# Patient Record
Sex: MALE | Race: WHITE | NOT HISPANIC OR LATINO | Employment: FULL TIME | URBAN - NONMETROPOLITAN AREA
[De-identification: names, ages, dates, MRNs, and addresses within clinical notes are randomized per-mention and may not be internally consistent; named-entity substitution may affect disease eponyms.]

---

## 2022-06-03 ENCOUNTER — OFFICE VISIT (OUTPATIENT)
Dept: SURGERY | Facility: CLINIC | Age: 32
End: 2022-06-03

## 2022-06-03 VITALS
SYSTOLIC BLOOD PRESSURE: 122 MMHG | WEIGHT: 195.8 LBS | HEART RATE: 79 BPM | HEIGHT: 70 IN | DIASTOLIC BLOOD PRESSURE: 64 MMHG | BODY MASS INDEX: 28.03 KG/M2

## 2022-06-03 DIAGNOSIS — R22.9 SUBCUTANEOUS MASS: Primary | ICD-10-CM

## 2022-06-03 PROCEDURE — 76857 US EXAM PELVIC LIMITED: CPT | Performed by: SURGERY

## 2022-06-03 PROCEDURE — 99203 OFFICE O/P NEW LOW 30 MIN: CPT | Performed by: SURGERY

## 2022-06-03 RX ORDER — LEVOFLOXACIN 500 MG/1
750 TABLET, FILM COATED ORAL DAILY
Qty: 10 TABLET | Refills: 0 | Status: SHIPPED | OUTPATIENT
Start: 2022-06-03 | End: 2022-06-13

## 2022-06-03 NOTE — PROGRESS NOTES
"Subjective   Gordon Matthews is a 31 y.o. male here today for mass on right buttock.     History of Present Illness  Mr. Matthews was seen in the office today for evaluation of a right subcutaneous perianal mass.  He states that it has been present for 3 to 4 weeks.  Initially it was very painful.  He took Zithromax for 5 days which she states was given to him for sinus infection.  He states that it did get smaller and less tender after the 5 days of antibiotics.  Never drained.  Patient denies any rectal pain or pain with bowel movements.  No fever or chills  Allergies   Allergen Reactions   • Augmentin [Amoxicillin-Pot Clavulanate] Hives   • Ceclor [Cefaclor] Hives     Current Outpatient Medications   Medication Sig Dispense Refill   • levoFLOXacin (Levaquin) 500 MG tablet Take 1.5 tablets by mouth Daily for 10 days. 10 tablet 0     No current facility-administered medications for this visit.     History reviewed. No pertinent past medical history.  Past Surgical History:   Procedure Laterality Date   • MOUTH SURGERY         Pertinent Review of Systems:  Respiratory: no shortness of breath  Cardiovascular: no chest pain  Other pertinent:      Objective   /64 (BP Location: Left arm)   Pulse 79   Ht 177.8 cm (70\")   Wt 88.8 kg (195 lb 12.8 oz)   BMI 28.09 kg/m²   Physical Exam  Perianal area: On the right inferior there is a 3-1/2 cm subcutaneous mass.  It is not clearly an abscess and there is no fluctuance but it does not have the characteristics of a lipoma.  There is no overlying cellulitis.  Procedures   Ultrasound soft tissue of perianal area    Indication: Subcutaneous mass  Description: With use of the 12.5 MHz transducer the area of clinical concern was scanned in multiple planes.  Findings: In the area of clinical concern there is a 2.63 Steidle mass which has a central hypoechoic area.  There is no posterior shadowing suggestive of an abscess.  Impression: Soft tissue mass right perianal area which " may represent a resolving abscess  Plan: Follow-up as clinically indicated  Results/Data:      Assessment & Plan   Perianal mass which may represent a resolving abscess although presentation is certainly atypical.  Size of mass is significantly larger than imaging finding    Plan: We will give the patient an additional 10 days of oral antibiotics with return to the office following.  If there is no improvement in the mass at that point in time would proceed with imaging to further evaluate         Discussion/Summary    Time spent:     BMI is >= 25 and < 30. (Overweight) The following options were offered after discussion: exercise counseling/recommendations       Future Appointments   Date Time Provider Department Center   6/20/2022  3:30 PM Dea Adams MD [facility]         Please note that portions of this note were completed with a voice recognition program.

## 2022-06-20 ENCOUNTER — OFFICE VISIT (OUTPATIENT)
Dept: SURGERY | Facility: CLINIC | Age: 32
End: 2022-06-20

## 2022-06-20 VITALS
HEART RATE: 76 BPM | HEIGHT: 70 IN | DIASTOLIC BLOOD PRESSURE: 78 MMHG | BODY MASS INDEX: 28.49 KG/M2 | SYSTOLIC BLOOD PRESSURE: 122 MMHG | WEIGHT: 199 LBS

## 2022-06-20 DIAGNOSIS — L02.31 CUTANEOUS ABSCESS OF BUTTOCK: ICD-10-CM

## 2022-06-20 DIAGNOSIS — L02.31 CUTANEOUS ABSCESS OF BUTTOCK: Primary | ICD-10-CM

## 2022-06-20 PROCEDURE — 87205 SMEAR GRAM STAIN: CPT | Performed by: SURGERY

## 2022-06-20 PROCEDURE — 46050 I&D PERIANAL ABSCESS SUPFC: CPT | Performed by: SURGERY

## 2022-06-20 PROCEDURE — 99212 OFFICE O/P EST SF 10 MIN: CPT | Performed by: SURGERY

## 2022-06-20 PROCEDURE — 87070 CULTURE OTHR SPECIMN AEROBIC: CPT | Performed by: SURGERY

## 2022-06-20 RX ORDER — LEVOFLOXACIN 750 MG/1
TABLET ORAL
COMMUNITY
Start: 2022-06-03

## 2022-06-20 NOTE — PROGRESS NOTES
"Subjective   Gordon Matthews is a 31 y.o. male here today for follow up. He only took four days of antibiotic.    History of Present Illness  Mr. Matthews was seen in the office today for follow-up of a right perineal mass.  He was initially seen on 6/3/2022 and the findings were felt to possibly reflect a resolving abscess.  He was given an additional prescription of Levaquin which he had a severe reaction to and discontinued after 3 doses.  He states that just recently he had some drainage in that area but he does not any longer feel a large mass.  Allergies   Allergen Reactions   • Augmentin [Amoxicillin-Pot Clavulanate] Hives   • Ceclor [Cefaclor] Hives       Current Outpatient Medications   Medication Sig Dispense Refill   • levoFLOXacin (LEVAQUIN) 750 MG tablet TAKE 1 TABLET BY MOUTH ONCE DAILY UNTIL GONE       No current facility-administered medications for this visit.     History reviewed. No pertinent past medical history.  Past Surgical History:   Procedure Laterality Date   • MOUTH SURGERY         Pertinent Review of Systems      Objective   /78 (BP Location: Left arm)   Pulse 76   Ht 177.8 cm (70\")   Wt 90.3 kg (199 lb)   BMI 28.55 kg/m²    Physical Exam  Well-developed well-nourished male in no acute distress  Perianal area the previously noted 3-1/2 cm mass has resolved.  There is a very small residual mass which is barely noticeable except for a drop of fluid draining from it.  There is no significant induration.  There is no significant fluctuance.  There is no cellulitis it is minimally tender.    Procedures   Procedure Note    Procedure: Incision and drainage abscess    Location: Right perianal area    Anesthesia: 1% lidocaine    Description:  The risks and benefits of the procedure were discussed.  After prep with Betadine and infiltration with lidocaine an 11 blade was used to incise the area of drainage.  There is minimal drainage from the area.  A large abscess cavity or residual tract was " not identified.    Complications:  none    Follow-up: As needed    Results/Data:      Assessment & Plan   Perianal abscess    Track culture  Follow-up as needed       Discussion/Summary:    Time spent:     BMI is >= 25 and <30. (Overweight) The following options were offered after discussion;: nutrition counseling/recommendations         No future appointments.      Please note that portions of this note were completed with a voice recognition program.

## 2022-06-23 LAB
BACTERIA SPEC AEROBE CULT: NORMAL
GRAM STN SPEC: NORMAL
GRAM STN SPEC: NORMAL

## 2022-06-24 PROBLEM — L02.31 CUTANEOUS ABSCESS OF BUTTOCK: Status: ACTIVE | Noted: 2022-06-24
